# Patient Record
Sex: FEMALE | Race: OTHER
[De-identification: names, ages, dates, MRNs, and addresses within clinical notes are randomized per-mention and may not be internally consistent; named-entity substitution may affect disease eponyms.]

---

## 2020-12-01 ENCOUNTER — HOSPITAL ENCOUNTER (OUTPATIENT)
Dept: HOSPITAL 92 - CTENTCT | Age: 20
Discharge: HOME | End: 2020-12-01
Attending: OTOLARYNGOLOGY
Payer: COMMERCIAL

## 2020-12-01 DIAGNOSIS — J32.9: Primary | ICD-10-CM

## 2020-12-01 PROCEDURE — 70486 CT MAXILLOFACIAL W/O DYE: CPT

## 2020-12-10 ENCOUNTER — HOSPITAL ENCOUNTER (OUTPATIENT)
Dept: HOSPITAL 92 - SDC | Age: 20
Discharge: HOME | End: 2020-12-10
Attending: OTOLARYNGOLOGY
Payer: COMMERCIAL

## 2020-12-10 VITALS — BODY MASS INDEX: 23.3 KG/M2

## 2020-12-10 DIAGNOSIS — J30.9: ICD-10-CM

## 2020-12-10 DIAGNOSIS — J34.3: ICD-10-CM

## 2020-12-10 DIAGNOSIS — J32.9: Primary | ICD-10-CM

## 2020-12-10 DIAGNOSIS — J34.2: ICD-10-CM

## 2020-12-10 LAB
PREGS CONTROL BACKGROUND?: (no result)
PREGS CONTROL BAR APPEAR?: YES

## 2020-12-10 PROCEDURE — 09BU8ZZ EXCISION OF RIGHT ETHMOID SINUS, VIA NATURAL OR ARTIFICIAL OPENING ENDOSCOPIC: ICD-10-PCS | Performed by: OTOLARYNGOLOGY

## 2020-12-10 PROCEDURE — 09BV8ZZ EXCISION OF LEFT ETHMOID SINUS, VIA NATURAL OR ARTIFICIAL OPENING ENDOSCOPIC: ICD-10-PCS | Performed by: OTOLARYNGOLOGY

## 2020-12-10 PROCEDURE — 09BL8ZZ EXCISION OF NASAL TURBINATE, VIA NATURAL OR ARTIFICIAL OPENING ENDOSCOPIC: ICD-10-PCS | Performed by: OTOLARYNGOLOGY

## 2020-12-10 PROCEDURE — 36415 COLL VENOUS BLD VENIPUNCTURE: CPT

## 2020-12-10 PROCEDURE — 85014 HEMATOCRIT: CPT

## 2020-12-10 PROCEDURE — 84703 CHORIONIC GONADOTROPIN ASSAY: CPT

## 2020-12-11 NOTE — OP
DATE OF PROCEDURE:  12/10/2020



PREOPERATIVE DIAGNOSES:  Chronic sinusitis, recurrent sinusitis, deviated septum,

and hypertrophic inferior turbinates. 



POSTOPERATIVE DIAGNOSES:  Chronic sinusitis, recurrent sinusitis, deviated septum,

and hypertrophic inferior turbinates. 



PROCEDURE PERFORMED:  Septoplasty and bilateral nasal endoscopy with submucosal

resection of inferior turbinates. 



DESCRIPTION OF PROCEDURE:  Septoplasty:  After local anesthesia was infiltrated into

the submucoperichondrial plane, a standard Alexx incision was made with a #15

blade down to the level of the septal cartilage.  The caudal elevator was used to

elevate the mucoperichondrium from the underlying cartilage.  We then proceeded

beyond the bony cartilaginous junction and elevated the bony periosteum as well.

Great attention was paid to the spur to prevent rent formation in the septal flap. A

transcartilaginous incision was then made, while preserving an adequate dorsal and

caudal cartilaginous strut for tip support.  The deformed cartilage was removed and

disarticulated from the bony cartilaginous junction and maxillary crest.  This was

placed in saline and would later be crushed and returned to the mucoperichondrial

envelope.  We then elevated the contralateral periosteum from the bony cartilaginous

region and removed the deformed portions of the bone and bony spurs.  The cartilage

was then crushed and placed back into the mucoperichondrial envelope and the mucosa

was re-approximated with a quilting stitch composed of rapidly absorbent gut suture.

 The Alexx incision was also closed with interrupted gut suture.  At the

completion of the case, Ackerman splints were placed and suture secured to the caudal

septum. 



Bilateral nasal endoscopy with submucosal resection of inferior turbinates:  After

consent was obtained, the patient was identified, brought to the operating room, and

placed on the operating room table in the supine position.  Consent was obtained,

notifying the patient of the possibility of additional infections, bleeding, brain

injury, and eye/orbital injury.   The patient was placed on the operating room

table, and general endotracheal anesthesia and intravenous access was obtained.  The

patient was then positioned, prepped and draped for endoscopic sinus surgery.  Nasal

preparation included trimming nasal vestibular hairs and spraying in topical Afrin.

We then placed Afrin topical solution on nasal pledgets and strategically located

them intranasally.  The perinasal mucosa was injected with 1% lidocaine with

1:100,000 epinephrine in the submucoperichondrial plane of the septum, lateral nasal

wall, and anterior to the uncinate.  The patient was then prepped and draped in a

sterile fashion and positioned for endoscopic sinus surgery. 



With the 0-degree endoscope, the patient underwent systematic nasal endoscopy.

There were no suspicious internasal masses or lesions identified.  We then focused

our attention to the osteomeatal complex region under the middle turbinate. 



The inferior turbinates were visualized with a 0 degree endoscope and outfractured

with a Liya elevator.  The inferior medial aspect was cauterized with the

electrocautery.  Hemostasis was obtained .  After adequate airway was established,

we turned our attention to the contralateral side and used a similar procedure.

Again, a Liya elevator was used to outfracture inferior turbinates under endoscopic

visualization.  With a suction cautery, the free inferior medial aspect was

cauterized under direct visualization along the length of the inferior turbinate. 



At this point, we then turned our attention to the contralateral side and proceeded

with endoscopic sinus surgery. 



At the completion of the case, Rice keel splints were placed in the ethmoid cavities

after the ethmoidectomy.  There were no complications.  The patient tolerated the

procedure well and was discharged to the recovery room in stable condition prior to

return to the preoperative day stay with ultimate discharge home.  Prescriptions for

pain medication and antibiotics were provided.  The patient received intramuscular

Depo-Medrol during the case. 







Job ID:  [id number]